# Patient Record
Sex: MALE | Race: WHITE | NOT HISPANIC OR LATINO | Employment: OTHER | URBAN - NONMETROPOLITAN AREA
[De-identification: names, ages, dates, MRNs, and addresses within clinical notes are randomized per-mention and may not be internally consistent; named-entity substitution may affect disease eponyms.]

---

## 2017-01-31 DIAGNOSIS — Z12.11 COLON CANCER SCREENING: Primary | ICD-10-CM

## 2017-01-31 LAB
CONTROL: NORMAL
HEMOCCULT STL QL: NORMAL

## 2017-01-31 PROCEDURE — 82274 ASSAY TEST FOR BLOOD FECAL: CPT | Performed by: FAMILY MEDICINE

## 2018-07-19 ENCOUNTER — OFFICE VISIT (OUTPATIENT)
Dept: UROLOGY | Facility: CLINIC | Age: 43
End: 2018-07-19

## 2018-07-19 VITALS — BODY MASS INDEX: 26.11 KG/M2 | HEIGHT: 75 IN | WEIGHT: 210 LBS | TEMPERATURE: 97.3 F

## 2018-07-19 DIAGNOSIS — Z30.09 CONSULTATION FOR STERILIZATION: Primary | ICD-10-CM

## 2018-07-19 PROCEDURE — 99203 OFFICE O/P NEW LOW 30 MIN: CPT | Performed by: UROLOGY

## 2018-07-19 RX ORDER — DIAZEPAM 10 MG/1
10 TABLET ORAL ONCE
Qty: 1 TABLET | Refills: 0 | Status: SHIPPED | OUTPATIENT
Start: 2018-07-19 | End: 2018-07-19

## 2018-07-19 RX ORDER — OXYCODONE HYDROCHLORIDE AND ACETAMINOPHEN 5; 325 MG/1; MG/1
1 TABLET ORAL EVERY 4 HOURS PRN
Qty: 18 TABLET | Refills: 0 | Status: SHIPPED | OUTPATIENT
Start: 2018-07-19

## 2019-09-22 ENCOUNTER — APPOINTMENT (OUTPATIENT)
Dept: CT IMAGING | Facility: HOSPITAL | Age: 44
End: 2019-09-22

## 2019-09-22 ENCOUNTER — HOSPITAL ENCOUNTER (EMERGENCY)
Facility: HOSPITAL | Age: 44
Discharge: HOME | End: 2019-09-22
Admitting: EMERGENCY MEDICINE

## 2019-09-22 VITALS
HEIGHT: 76 IN | DIASTOLIC BLOOD PRESSURE: 81 MMHG | BODY MASS INDEX: 26.3 KG/M2 | OXYGEN SATURATION: 99 % | HEART RATE: 70 BPM | SYSTOLIC BLOOD PRESSURE: 120 MMHG | TEMPERATURE: 98 F | RESPIRATION RATE: 17 BRPM | WEIGHT: 216 LBS

## 2019-09-22 DIAGNOSIS — R10.31 RIGHT LOWER QUADRANT ABDOMINAL PAIN: ICD-10-CM

## 2019-09-22 DIAGNOSIS — R10.9 FLANK PAIN: Primary | ICD-10-CM

## 2019-09-22 LAB
ALBUMIN SERPL-MCNC: 4.2 G/DL (ref 3.5–5.2)
ALBUMIN/GLOB SERPL: 1.5 G/DL
ALP SERPL-CCNC: 76 U/L (ref 39–117)
ALT SERPL W P-5'-P-CCNC: 19 U/L (ref 1–41)
ANION GAP SERPL CALCULATED.3IONS-SCNC: 9 MMOL/L (ref 5–15)
AST SERPL-CCNC: 22 U/L (ref 1–40)
BASOPHILS # BLD AUTO: 0.08 10*3/MM3 (ref 0–0.2)
BASOPHILS NFR BLD AUTO: 1.1 % (ref 0–1.5)
BILIRUB SERPL-MCNC: 0.2 MG/DL (ref 0.2–1.2)
BILIRUB UR QL STRIP: NEGATIVE
BUN BLD-MCNC: 17 MG/DL (ref 6–20)
BUN/CREAT SERPL: 20 (ref 7–25)
CALCIUM SPEC-SCNC: 8.8 MG/DL (ref 8.6–10.5)
CHLORIDE SERPL-SCNC: 104 MMOL/L (ref 98–107)
CLARITY UR: CLEAR
CO2 SERPL-SCNC: 29 MMOL/L (ref 22–29)
COLOR UR: YELLOW
CREAT BLD-MCNC: 0.85 MG/DL (ref 0.76–1.27)
D-LACTATE SERPL-SCNC: 1.3 MMOL/L (ref 0.5–2)
DEPRECATED RDW RBC AUTO: 39.5 FL (ref 37–54)
EOSINOPHIL # BLD AUTO: 0.26 10*3/MM3 (ref 0–0.4)
EOSINOPHIL NFR BLD AUTO: 3.6 % (ref 0.3–6.2)
ERYTHROCYTE [DISTWIDTH] IN BLOOD BY AUTOMATED COUNT: 12.8 % (ref 12.3–15.4)
GFR SERPL CREATININE-BSD FRML MDRD: 98 ML/MIN/1.73
GLOBULIN UR ELPH-MCNC: 2.8 GM/DL
GLUCOSE BLD-MCNC: 113 MG/DL (ref 65–99)
GLUCOSE UR STRIP-MCNC: NEGATIVE MG/DL
HCT VFR BLD AUTO: 44.5 % (ref 37.5–51)
HGB BLD-MCNC: 14.4 G/DL (ref 13–17.7)
HGB UR QL STRIP.AUTO: NEGATIVE
IMM GRANULOCYTES # BLD AUTO: 0.01 10*3/MM3 (ref 0–0.05)
IMM GRANULOCYTES NFR BLD AUTO: 0.1 % (ref 0–0.5)
KETONES UR QL STRIP: NEGATIVE
LEUKOCYTE ESTERASE UR QL STRIP.AUTO: NEGATIVE
LYMPHOCYTES # BLD AUTO: 2.86 10*3/MM3 (ref 0.7–3.1)
LYMPHOCYTES NFR BLD AUTO: 39.5 % (ref 19.6–45.3)
MCH RBC QN AUTO: 27.4 PG (ref 26.6–33)
MCHC RBC AUTO-ENTMCNC: 32.4 G/DL (ref 31.5–35.7)
MCV RBC AUTO: 84.8 FL (ref 79–97)
MONOCYTES # BLD AUTO: 0.51 10*3/MM3 (ref 0.1–0.9)
MONOCYTES NFR BLD AUTO: 7 % (ref 5–12)
NEUTROPHILS # BLD AUTO: 3.52 10*3/MM3 (ref 1.7–7)
NEUTROPHILS NFR BLD AUTO: 48.7 % (ref 42.7–76)
NITRITE UR QL STRIP: NEGATIVE
NRBC BLD AUTO-RTO: 0 /100 WBC (ref 0–0.2)
PH UR STRIP.AUTO: <=5 [PH] (ref 5–8)
PLATELET # BLD AUTO: 219 10*3/MM3 (ref 140–450)
PMV BLD AUTO: 10.7 FL (ref 6–12)
POTASSIUM BLD-SCNC: 4.2 MMOL/L (ref 3.5–5.2)
PROT SERPL-MCNC: 7 G/DL (ref 6–8.5)
PROT UR QL STRIP: NEGATIVE
RBC # BLD AUTO: 5.25 10*6/MM3 (ref 4.14–5.8)
SODIUM BLD-SCNC: 142 MMOL/L (ref 136–145)
SP GR UR STRIP: 1.03 (ref 1–1.03)
UROBILINOGEN UR QL STRIP: NORMAL
WBC NRBC COR # BLD: 7.24 10*3/MM3 (ref 3.4–10.8)

## 2019-09-22 PROCEDURE — 80053 COMPREHEN METABOLIC PANEL: CPT | Performed by: NURSE PRACTITIONER

## 2019-09-22 PROCEDURE — 85025 COMPLETE CBC W/AUTO DIFF WBC: CPT | Performed by: NURSE PRACTITIONER

## 2019-09-22 PROCEDURE — 83605 ASSAY OF LACTIC ACID: CPT | Performed by: NURSE PRACTITIONER

## 2019-09-22 PROCEDURE — 25010000002 IOPAMIDOL 61 % SOLUTION: Performed by: NURSE PRACTITIONER

## 2019-09-22 PROCEDURE — 81003 URINALYSIS AUTO W/O SCOPE: CPT | Performed by: NURSE PRACTITIONER

## 2019-09-22 PROCEDURE — 74177 CT ABD & PELVIS W/CONTRAST: CPT

## 2019-09-22 PROCEDURE — 99283 EMERGENCY DEPT VISIT LOW MDM: CPT

## 2019-09-22 RX ORDER — SODIUM CHLORIDE 0.9 % (FLUSH) 0.9 %
10 SYRINGE (ML) INJECTION AS NEEDED
Status: DISCONTINUED | OUTPATIENT
Start: 2019-09-22 | End: 2019-09-22 | Stop reason: HOSPADM

## 2019-09-22 RX ORDER — LIDOCAINE 50 MG/G
1 PATCH TOPICAL EVERY 24 HOURS
Qty: 5 PATCH | Refills: 1 | Status: SHIPPED | OUTPATIENT
Start: 2019-09-22

## 2019-09-22 RX ORDER — NAPROXEN 500 MG/1
500 TABLET ORAL 2 TIMES DAILY PRN
Qty: 15 TABLET | Refills: 0 | Status: SHIPPED | OUTPATIENT
Start: 2019-09-22

## 2019-09-22 RX ORDER — CYCLOBENZAPRINE HCL 10 MG
10 TABLET ORAL 3 TIMES DAILY PRN
Qty: 15 TABLET | Refills: 0 | Status: SHIPPED | OUTPATIENT
Start: 2019-09-22 | End: 2019-10-07

## 2019-09-22 RX ADMIN — SODIUM CHLORIDE 500 ML: 9 INJECTION, SOLUTION INTRAVENOUS at 13:53

## 2019-09-22 RX ADMIN — IOPAMIDOL 100 ML: 612 INJECTION, SOLUTION INTRAVENOUS at 15:15

## 2019-09-22 NOTE — ED PROVIDER NOTES
Subjective   Patient is a 40-year-old male that presents ER today with complaint of flank pain.  Patient reports that for the past 3 weeks he has had right flank pain.  He states that is been constant in nature.  He states that he is now having pain that radiates to the right lower quadrant.  Patient denies nausea or vomiting.  He denies any diarrhea constipation.  Patient denies fever.  Patient denies any hematuria or dysuria.  The patient states that he initially thought that the pain was due to his back.  He states that he has seen his chiropractor however he has had no improvement with his pain.  Patient denies any known injury to his back.  He states he is concerned that he may have a kidney stone.  The patient still does have his appendix.  He presents here today for further evaluation        History provided by:  Patient   used: No    Abdominal Pain   Pain location:  R flank  Pain quality: sharp and stabbing    Pain radiates to:  RLQ  Pain severity:  Mild  Onset quality:  Sudden  Duration:  3 weeks  Timing:  Constant  Progression:  Worsening  Chronicity:  New  Context: not alcohol use, not awakening from sleep, not diet changes, not eating, not laxative use, not medication withdrawal, not previous surgeries, not recent illness, not recent sexual activity, not recent travel, not retching, not sick contacts, not suspicious food intake and not trauma    Relieved by:  Nothing  Worsened by:  Nothing  Ineffective treatments:  None tried  Associated symptoms: nausea    Associated symptoms: no anorexia, no belching, no chest pain, no chills, no constipation, no cough, no diarrhea, no dysuria, no fatigue, no fever, no flatus, no hematemesis, no hematochezia, no hematuria, no melena, no shortness of breath, no sore throat, no vaginal bleeding, no vaginal discharge and no vomiting    Risk factors: no alcohol abuse, no aspirin use, not elderly, has not had multiple surgeries, no NSAID use, not obese,  not pregnant and no recent hospitalization        Review of Systems   Constitutional: Negative for chills, fatigue and fever.   HENT: Negative for sore throat.    Respiratory: Negative for cough and shortness of breath.    Cardiovascular: Negative for chest pain.   Gastrointestinal: Positive for abdominal pain and nausea. Negative for anorexia, constipation, diarrhea, flatus, hematemesis, hematochezia, melena and vomiting.   Genitourinary: Negative for dysuria, hematuria, vaginal bleeding and vaginal discharge.   All other systems reviewed and are negative.      No past medical history on file.    No Known Allergies    No past surgical history on file.    Family History   Problem Relation Age of Onset   • No Known Problems Father    • No Known Problems Mother        Social History     Socioeconomic History   • Marital status:      Spouse name: Not on file   • Number of children: Not on file   • Years of education: Not on file   • Highest education level: Not on file   Tobacco Use   • Smoking status: Never Smoker   • Smokeless tobacco: Never Used   Substance and Sexual Activity   • Alcohol use: No   • Drug use: No   • Sexual activity: Defer           Objective   Physical Exam    Procedures           ED Course  ED Course as of Sep 22 1546   Sun Sep 22, 2019   1456 Pt case discussed with CHAVA Somers  who will assume pt case at this time.   [LF]   1535 Patient transferred to my care by nurse practitioner Valerie Davidson at her shift change.  Above is her history, review of system, physical examination and work-up.  I did interview and evaluate the patient and agree with plan of care at this time.  Briefly this is a 43-year-old  male with right flank pain radiating to the right lower quadrant intermittently for the last 3 weeks.  Worse with movement.  He has been seen a chiropractor without relief.  [CP]   1537 Impression    1. No acute process in the abdomen or pelvis.  2. Solitary tiny  nonobstructing right renal stone. No obstructing stone.  3. No inflammatory changes along the bowel.  4. Grade 1 lytic spondylolisthesis at L5-S1.        This report was finalized on 09/22/2019 15:32 by Dr Jason Swenson, .        Specimen Collected: 09/22/19 15:27 Last Resulted: 09/22/19 15:32        [CP]      ED Course User Index  [CP] Bob Porter PA-C  [LF] Valerie Davidson, AGUSTINA                  MDM  Number of Diagnoses or Management Options  Flank pain: new and requires workup  Right lower quadrant abdominal pain: new and requires workup  Diagnosis management comments: 43-year-old with right flank pain ongoing for 3 weeks intermittently worse with movement, twisting and bending forward.  CT scan of the abdomen was negative for any acute abdominal pathology but did show spondee lithiasis of L4-L5.  Likely causing his symptoms.  Labs are within normal limits.  No indications for further work-up at this time.  At time of discharge stable vital signs pain decently controlled.  We will discharge and have follow-up with the orthopedic [place] primary care physician.  Strict return precautions given strongly encouraged follow-up.       Amount and/or Complexity of Data Reviewed  Clinical lab tests: reviewed and ordered  Tests in the radiology section of CPT®: ordered and reviewed  Review and summarize past medical records: yes  Discuss the patient with other providers: yes  Independent visualization of images, tracings, or specimens: yes    Risk of Complications, Morbidity, and/or Mortality  Presenting problems: moderate  Diagnostic procedures: moderate  Management options: moderate    Patient Progress  Patient progress: stable      Final diagnoses:   Flank pain   Right lower quadrant abdominal pain              Bob Porter PA-C  09/22/19 1546

## 2019-09-22 NOTE — DISCHARGE INSTRUCTIONS
Please follow-up with your primary care physician in the orthopedic [place] for reevaluation of the symptoms as they did not improve the next 3 to 4 days.  At this time your CT scan of your abdomen is negative for any acute abnormalities however there does show some lumbar spine spondylolithesis. This is mild on your CT but could be causing your symptoms.  Please try the medications as prescribed and follow-up with the orthopedic [place].  Return to the ER should you develop any new, worsening or other concerning symptoms such as those listed below.      Contact a health care provider if:  You have pain that gets worse or does not get better.  Get help right away if:  You have severe back pain.  You develop weakness or numbness in your legs.  You are unable to stand or walk

## 2019-10-22 ENCOUNTER — OFFICE VISIT (OUTPATIENT)
Dept: PRIMARY CARE CLINIC | Age: 44
End: 2019-10-22
Payer: MEDICAID

## 2019-10-22 VITALS
DIASTOLIC BLOOD PRESSURE: 70 MMHG | OXYGEN SATURATION: 98 % | WEIGHT: 222.2 LBS | RESPIRATION RATE: 18 BRPM | BODY MASS INDEX: 27.06 KG/M2 | HEART RATE: 67 BPM | HEIGHT: 76 IN | TEMPERATURE: 98 F | SYSTOLIC BLOOD PRESSURE: 130 MMHG

## 2019-10-22 DIAGNOSIS — Z23 NEED FOR INFLUENZA VACCINATION: ICD-10-CM

## 2019-10-22 DIAGNOSIS — Z00.00 WELL ADULT EXAM: Primary | ICD-10-CM

## 2019-10-22 PROCEDURE — 90471 IMMUNIZATION ADMIN: CPT | Performed by: FAMILY MEDICINE

## 2019-10-22 PROCEDURE — G8482 FLU IMMUNIZE ORDER/ADMIN: HCPCS | Performed by: FAMILY MEDICINE

## 2019-10-22 PROCEDURE — 90686 IIV4 VACC NO PRSV 0.5 ML IM: CPT | Performed by: FAMILY MEDICINE

## 2019-10-22 PROCEDURE — 99386 PREV VISIT NEW AGE 40-64: CPT | Performed by: FAMILY MEDICINE

## 2019-10-22 ASSESSMENT — PATIENT HEALTH QUESTIONNAIRE - PHQ9
2. FEELING DOWN, DEPRESSED OR HOPELESS: 0
SUM OF ALL RESPONSES TO PHQ QUESTIONS 1-9: 0
SUM OF ALL RESPONSES TO PHQ9 QUESTIONS 1 & 2: 0
1. LITTLE INTEREST OR PLEASURE IN DOING THINGS: 0
SUM OF ALL RESPONSES TO PHQ QUESTIONS 1-9: 0

## 2019-11-07 ENCOUNTER — NURSE ONLY (OUTPATIENT)
Dept: PRIMARY CARE CLINIC | Age: 44
End: 2019-11-07
Payer: MEDICAID

## 2019-11-07 DIAGNOSIS — Z00.00 WELL ADULT EXAM: Primary | ICD-10-CM

## 2019-11-07 LAB
ALBUMIN SERPL-MCNC: 4.4 G/DL (ref 3.5–5.2)
ALP BLD-CCNC: 72 U/L (ref 40–130)
ALT SERPL-CCNC: 25 U/L (ref 5–41)
ANION GAP SERPL CALCULATED.3IONS-SCNC: 12 MMOL/L (ref 7–19)
AST SERPL-CCNC: 26 U/L (ref 5–40)
BILIRUB SERPL-MCNC: 0.6 MG/DL (ref 0.2–1.2)
BUN BLDV-MCNC: 11 MG/DL (ref 6–20)
CALCIUM SERPL-MCNC: 9.5 MG/DL (ref 8.6–10)
CHLORIDE BLD-SCNC: 101 MMOL/L (ref 98–111)
CHOLESTEROL, TOTAL: 204 MG/DL (ref 160–199)
CO2: 29 MMOL/L (ref 22–29)
CREAT SERPL-MCNC: 1 MG/DL (ref 0.5–1.2)
GFR NON-AFRICAN AMERICAN: >60
GLUCOSE BLD-MCNC: 94 MG/DL (ref 74–109)
HDLC SERPL-MCNC: 54 MG/DL (ref 55–121)
LDL CHOLESTEROL CALCULATED: 127 MG/DL
POTASSIUM SERPL-SCNC: 4.4 MMOL/L (ref 3.5–5)
SODIUM BLD-SCNC: 142 MMOL/L (ref 136–145)
TOTAL PROTEIN: 7.3 G/DL (ref 6.6–8.7)
TRIGL SERPL-MCNC: 114 MG/DL (ref 0–149)

## 2019-11-07 PROCEDURE — 36415 COLL VENOUS BLD VENIPUNCTURE: CPT | Performed by: FAMILY MEDICINE

## 2020-10-26 ENCOUNTER — OFFICE VISIT (OUTPATIENT)
Dept: PRIMARY CARE CLINIC | Age: 45
End: 2020-10-26
Payer: MEDICAID

## 2020-10-26 VITALS
OXYGEN SATURATION: 99 % | RESPIRATION RATE: 16 BRPM | HEART RATE: 75 BPM | WEIGHT: 216.6 LBS | TEMPERATURE: 98.3 F | DIASTOLIC BLOOD PRESSURE: 86 MMHG | SYSTOLIC BLOOD PRESSURE: 118 MMHG | HEIGHT: 75 IN | BODY MASS INDEX: 26.93 KG/M2

## 2020-10-26 PROCEDURE — 90686 IIV4 VACC NO PRSV 0.5 ML IM: CPT | Performed by: FAMILY MEDICINE

## 2020-10-26 PROCEDURE — 99396 PREV VISIT EST AGE 40-64: CPT | Performed by: FAMILY MEDICINE

## 2020-10-26 PROCEDURE — G8482 FLU IMMUNIZE ORDER/ADMIN: HCPCS | Performed by: FAMILY MEDICINE

## 2020-10-26 PROCEDURE — 90471 IMMUNIZATION ADMIN: CPT | Performed by: FAMILY MEDICINE

## 2020-10-26 RX ORDER — NAPROXEN 500 MG/1
TABLET ORAL
COMMUNITY
Start: 2019-09-30 | End: 2020-10-26

## 2020-10-26 RX ORDER — CYCLOBENZAPRINE HCL 10 MG
TABLET ORAL
COMMUNITY
End: 2020-10-26

## 2020-10-26 ASSESSMENT — PATIENT HEALTH QUESTIONNAIRE - PHQ9
SUM OF ALL RESPONSES TO PHQ QUESTIONS 1-9: 0
SUM OF ALL RESPONSES TO PHQ9 QUESTIONS 1 & 2: 0
2. FEELING DOWN, DEPRESSED OR HOPELESS: 0
1. LITTLE INTEREST OR PLEASURE IN DOING THINGS: 0

## 2020-10-26 NOTE — PATIENT INSTRUCTIONS
GET counter athlete's foot medication-cream or powder. Dry between your toes after each bath and then apply the medicine. It should clear up the stuff between your toes. You can put over-the-counter hydrocortisone cream on the top of your foot but it would not help between your toes. We are committed to providing you with the best care possible. In order to help us achieve these goals please remember to bring all medications, herbal products, and over the counter supplements with you to each visit. If your provider has ordered testing for you, please be sure to follow up with our office if you have not received results within 7 days after the testing took place. *If you receive a survey after visiting one of our offices, please take time to share your experience concerning your physician office visit. These surveys are confidential and no health information about you is shared. We are eager to improve for you and we are counting on your feedback to help make that happen. Patient Education        Seborrheic Keratosis: Care Instructions  Your Care Instructions  Seborrheic keratoses are raised skin growths that look scaly or warty. They usually look like they were stuck onto the skin. They most often grow in groups on the back or chest and are more common in older people. A seborrheic keratosis can be tan or dark brown. A seborrheic keratosis is not a mole and is almost always harmless. But it is still a good idea to check your skin regularly. Sometimes a seborrheic keratosis can itch. Scratching it can cause it to bleed and sometimes even scar. A seborrheic keratosis is removed only if it bothers you. The doctor will freeze it or scrape it off with a tool. The doctor can also use a laser to remove a seborrheic keratosis. Treatment usually results in normal-looking skin, but it can leave a light or dark juan antonio or even a scar on the skin.   Follow-up care is a key part of your treatment and safety. Be sure to make and go to all appointments, and call your doctor if you are having problems. It's also a good idea to know your test results and keep a list of the medicines you take. How can you care for yourself at home? · If clothing irritates your seborrheic keratosis, cover it with a bandage to prevent rubbing and bleeding. · If you have a seborrheic keratosis removed, clean the area with soap and water two times a day unless your doctor gives you different instructions. Don't use hydrogen peroxide or alcohol, which can slow healing. ? You may cover the wound with a thin layer of petroleum jelly, such as Vaseline, and a nonstick bandage. · If you see a change in a skin growth, contact your doctor. Look for:  ? A mole that bleeds. ? A fast-growing mole. ? A scaly or crusted growth on the skin. ? A sore that will not heal.  When should you call for help? Call your doctor now or seek immediate medical care if:    · You have an area of normal skin that suddenly changes in shape, size, or how it looks.     · Your skin is badly broken from scratching.     · You have signs of infection such as:  ? Pain, warmth, or swelling in your skin. ? Red streaks near a wound in your skin. ? Pus coming from a wound in your skin. ? A fever not due to the flu or other illness. Watch closely for changes in your health, and be sure to contact your doctor if:    · You do not get better as expected. Where can you learn more? Go to https://Just Eatnisha.Codelearn. org and sign in to your [employer] account. Enter T823 in the [facility] box to learn more about \"Seborrheic Keratosis: Care Instructions. \"     If you do not have an account, please click on the \"Sign Up Now\" link. Current as of: July 2, 2020               Content Version: 12.6  © 5588-6363 [employer]. Care instructions adapted under license by [facility] ([facility]).  If you have questions about a medical condition or this instruction, always ask your healthcare professional. [place] [address] any warranty or liability for your use of this information. Patient Education        Athlete's Foot: Care Instructions  Your Care Instructions     Athlete's foot is an itchy rash on the foot caused by an infection with a fungus. You can get it by going barefoot in wet public areas, such as swimming pools or locker rooms. Many times there is no clear reason why you get athlete's foot. You can easily treat athlete's foot by putting medicine on your feet for 1 to 6 weeks. In some cases, a doctor may prescribe pills to kill the fungus. Follow-up care is a key part of your treatment and safety. Be sure to make and go to all appointments, and call your doctor if you are having problems. It's also a good idea to know your test results and keep a list of the medicines you take. How can you care for yourself at home? · Your doctor may suggest an over-the counter lotion or spray or may prescribe a medicine. Take your medicines exactly as prescribed. Call your doctor if you think you are having a problem with your medicine. · Keep your feet clean and dry. · When you get dressed, put your socks on before your underwear. This can prevent the fungus from spreading from your feet to your groin. To prevent athlete's foot  · Wear flip-flops or other shower sandals in public locker rooms and showers and by the pool. · Dry between your toes after swimming or bathing. · Wear leather shoes or sandals, which let air get to your feet. · Change your socks as needed so your feet stay as dry as possible. · Use antifungal powder on your feet. When should you call for help? Watch closely for changes in your health, and be sure to contact your doctor if:    · You do not get better as expected. Where can you learn more? Go to https://chpepiceweb.Kick Sport. org and sign in to your [employer] account.  Enter M498 in the [address] box to learn more about \"Athlete's Foot: Care Instructions. \"     If you do not have an account, please click on the \"Sign Up Now\" link. Current as of: July 2, 2020               Content Version: 12.6  © 6336-6463 [employer], Incorporated. Care instructions adapted under license by [facility] ([facility]). If you have questions about a medical condition or this instruction, always ask your healthcare professional. [address] any warranty or liability for your use of this information.

## 2020-10-26 NOTE — PROGRESS NOTES
SUBJECTIVE:   Elham Pete is a 39 y.o. male presenting for his annual checkup. He is usually healthy. His wife has 3 spots that she would like me to look at. He denies any problems. Labs were done last year and the LDL was 127 with an HDL of 54 and a total of 204. He also had a spot on his left earlobe that was a little tender and they squeezed it and some clear liquid came out. It is getting better. He is also complaining of an itchy spot. on top of his left foot. There are no active problems to display for this patient. No current outpatient medications on file. No current facility-administered medications for this visit. Past Medical History:   Diagnosis Date    Herniated disc, cervical      History reviewed. No pertinent surgical history. Family History   Problem Relation Age of Onset    Heart Disease Father      Social History     Tobacco Use    Smoking status: Never Smoker    Smokeless tobacco: Never Used   Substance Use Topics    Alcohol use: No       Allergies: Patient has no known allergies. ROS:  Feeling well. No dyspnea or chest pain on exertion. No abdominal pain, change in bowel habits, black or bloody stools. No urinary tract or prostatic symptoms. No neurological complaints. All other systems negative. OBJECTIVE:   The patient appears well, alert, oriented x 3, in no distress. /86 (Site: Right Upper Arm, Position: Sitting, Cuff Size: Medium Adult)   Pulse 75   Temp 98.3 °F (36.8 °C) (Temporal)   Resp 16   Ht 6' 3\" (1.905 m)   Wt 216 lb 9.6 oz (98.2 kg)   SpO2 99%   BMI 27.07 kg/m²   Skin normal, no suspicious skin lesions. Left anterior shoulder there are 2 macular lesions which are tan and darker brown with skin lines running through them. On his left pinna there is a small white bump but he says it is getting better. Between his toes there is a whitish damp rash with a secondarily excoriated area on top of his left foot.   ENT normal.  Neck supple. No adenopathy or thyromegaly. NADIR. Lungs are clear, good air entry, no wheezes, rhonchi or rales. S1 and S2 normal, no murmurs, regular rate and rhythm. Abdomen is soft without tenderness, guarding, mass or organomegaly.  exam: Examined in light of his age and lack of symptoms. Extremities show no edema, normal peripheral pulses. Neurological is normal without focal findings. Psychiatric exam, no signs of depression. ASSESSMENT:   1. Well adult exam    2. Need for influenza vaccination        PLAN:   Lifestyle advice: discussed diet and exercise  MEDICATIONS:  No orders of the defined types were placed in this encounter. ORDERS:  Orders Placed This Encounter   Procedures    INFLUENZA, QUADV, 3 YRS AND OLDER, IM PF, PREFILL SYR OR SDV, 0.5ML (Jhonny Hicks, PF)   Reassurance was given regarding the 2 lesions on his left anterior shoulder. We talked about how to treat his athlete's foot. If the lesion on his ear changes he is to let me know but I think it is benign. Talked about doing blood work but we will wait till next year. If he has any problems he is to let me know. Follow-up:  Return in about 1 year (around 10/26/2021) for Physical and FASTING LABS in 1 year. PATIENT INSTRUCTIONS:  Patient Instructions     GET counter athlete's foot medication-cream or powder. Dry between your toes after each bath and then apply the medicine. It should clear up the stuff between your toes. You can put over-the-counter hydrocortisone cream on the top of your foot but it would not help between your toes. We are committed to providing you with the best care possible. In order to help us achieve these goals please remember to bring all medications, herbal products, and over the counter supplements with you to each visit.      If your provider has ordered testing for you, please be sure to follow up with our office if you have not received results within 7 days after the testing took place. *If you receive a survey after visiting one of our offices, please take time to share your experience concerning your physician office visit. These surveys are confidential and no health information about you is shared. We are eager to improve for you and we are counting on your feedback to help make that happen. Patient Education        Seborrheic Keratosis: Care Instructions  Your Care Instructions  Seborrheic keratoses are raised skin growths that look scaly or warty. They usually look like they were stuck onto the skin. They most often grow in groups on the back or chest and are more common in older people. A seborrheic keratosis can be tan or dark brown. A seborrheic keratosis is not a mole and is almost always harmless. But it is still a good idea to check your skin regularly. Sometimes a seborrheic keratosis can itch. Scratching it can cause it to bleed and sometimes even scar. A seborrheic keratosis is removed only if it bothers you. The doctor will freeze it or scrape it off with a tool. The doctor can also use a laser to remove a seborrheic keratosis. Treatment usually results in normal-looking skin, but it can leave a light or dark juan antonio or even a scar on the skin. Follow-up care is a key part of your treatment and safety. Be sure to make and go to all appointments, and call your doctor if you are having problems. It's also a good idea to know your test results and keep a list of the medicines you take. How can you care for yourself at home? · If clothing irritates your seborrheic keratosis, cover it with a bandage to prevent rubbing and bleeding. · If you have a seborrheic keratosis removed, clean the area with soap and water two times a day unless your doctor gives you different instructions. Don't use hydrogen peroxide or alcohol, which can slow healing. ? You may cover the wound with a thin layer of petroleum jelly, such as Vaseline, and a nonstick bandage.   · If you see a change in a skin growth, contact your doctor. Look for:  ? A mole that bleeds. ? A fast-growing mole. ? A scaly or crusted growth on the skin. ? A sore that will not heal.  When should you call for help? Call your doctor now or seek immediate medical care if:    · You have an area of normal skin that suddenly changes in shape, size, or how it looks.     · Your skin is badly broken from scratching.     · You have signs of infection such as:  ? Pain, warmth, or swelling in your skin. ? Red streaks near a wound in your skin. ? Pus coming from a wound in your skin. ? A fever not due to the flu or other illness. Watch closely for changes in your health, and be sure to contact your doctor if:    · You do not get better as expected. Where can you learn more? Go to https://Ecriopepiceweb.Vinogusto.com. org and sign in to your [employer] account. Enter J573 in the [employer] box to learn more about \"Seborrheic Keratosis: Care Instructions. \"     If you do not have an account, please click on the \"Sign Up Now\" link. Current as of: July 2, 2020               Content Version: 12.6  © 8026-7330 [employer]. Care instructions adapted under license by [facility] ([facility]). If you have questions about a medical condition or this instruction, always ask your healthcare professional. [place] [address] any warranty or liability for your use of this information. Patient Education        Athlete's Foot: Care Instructions  Your Care Instructions     Athlete's foot is an itchy rash on the foot caused by an infection with a fungus. You can get it by going barefoot in wet public areas, such as swimming pools or locker rooms. Many times there is no clear reason why you get athlete's foot. You can easily treat athlete's foot by putting medicine on your feet for 1 to 6 weeks. In some cases, a doctor may prescribe pills to kill the fungus.   Follow-up care is a key part of your treatment and safety. Be sure to make and go to all appointments, and call your doctor if you are having problems. It's also a good idea to know your test results and keep a list of the medicines you take. How can you care for yourself at home? · Your doctor may suggest an over-the counter lotion or spray or may prescribe a medicine. Take your medicines exactly as prescribed. Call your doctor if you think you are having a problem with your medicine. · Keep your feet clean and dry. · When you get dressed, put your socks on before your underwear. This can prevent the fungus from spreading from your feet to your groin. To prevent athlete's foot  · Wear flip-flops or other shower sandals in public locker rooms and showers and by the pool. · Dry between your toes after swimming or bathing. · Wear leather shoes or sandals, which let air get to your feet. · Change your socks as needed so your feet stay as dry as possible. · Use antifungal powder on your feet. When should you call for help? Watch closely for changes in your health, and be sure to contact your doctor if:    · You do not get better as expected. Where can you learn more? Go to https://Northern Defence & Securitypepiceweb.Dynamixyz. org and sign in to your [employer] account. Enter M498 in the [facility] box to learn more about \"Athlete's Foot: Care Instructions. \"     If you do not have an account, please click on the \"Sign Up Now\" link. Current as of: July 2, 2020               Content Version: 12.6  © 2006-2020 [employer], Incorporated. Care instructions adapted under license by [facility] ([facility]). If you have questions about a medical condition or this instruction, always ask your healthcare professional. [place] [address] any warranty or liability for your use of this information. EMR Dragon/transcription disclaimer:  Much of this encounter note is electronic transcription/translation of spoken language to printed texts.   The electronic translation of spoken language may be erroneous, or at times, nonsensical words or phrases may be inadvertently transcribed.   Although I have reviewed the note for such errors, some may still exist.

## 2021-11-01 ENCOUNTER — OFFICE VISIT (OUTPATIENT)
Dept: PRIMARY CARE CLINIC | Age: 46
End: 2021-11-01
Payer: MEDICAID

## 2021-11-01 VITALS
HEART RATE: 61 BPM | OXYGEN SATURATION: 98 % | SYSTOLIC BLOOD PRESSURE: 120 MMHG | BODY MASS INDEX: 27.1 KG/M2 | TEMPERATURE: 97.6 F | WEIGHT: 218 LBS | DIASTOLIC BLOOD PRESSURE: 80 MMHG | HEIGHT: 75 IN

## 2021-11-01 DIAGNOSIS — Z00.00 WELL ADULT EXAM: Primary | ICD-10-CM

## 2021-11-01 LAB
ALBUMIN SERPL-MCNC: 4.4 G/DL (ref 3.5–5.2)
ALP BLD-CCNC: 91 U/L (ref 40–130)
ALT SERPL-CCNC: 23 U/L (ref 5–41)
ANION GAP SERPL CALCULATED.3IONS-SCNC: 10 MMOL/L (ref 7–19)
AST SERPL-CCNC: 20 U/L (ref 5–40)
BILIRUB SERPL-MCNC: 0.3 MG/DL (ref 0.2–1.2)
BUN BLDV-MCNC: 11 MG/DL (ref 6–20)
CALCIUM SERPL-MCNC: 9.4 MG/DL (ref 8.6–10)
CHLORIDE BLD-SCNC: 103 MMOL/L (ref 98–111)
CHOLESTEROL, TOTAL: 209 MG/DL (ref 160–199)
CO2: 28 MMOL/L (ref 22–29)
CREAT SERPL-MCNC: 1 MG/DL (ref 0.5–1.2)
GFR AFRICAN AMERICAN: >59
GFR NON-AFRICAN AMERICAN: >60
GLUCOSE BLD-MCNC: 93 MG/DL (ref 74–109)
HDLC SERPL-MCNC: 49 MG/DL (ref 55–121)
LDL CHOLESTEROL CALCULATED: 117 MG/DL
POTASSIUM SERPL-SCNC: 4.3 MMOL/L (ref 3.5–5)
SODIUM BLD-SCNC: 141 MMOL/L (ref 136–145)
TOTAL PROTEIN: 7.4 G/DL (ref 6.6–8.7)
TRIGL SERPL-MCNC: 213 MG/DL (ref 0–149)

## 2021-11-01 PROCEDURE — 99396 PREV VISIT EST AGE 40-64: CPT | Performed by: FAMILY MEDICINE

## 2021-11-01 PROCEDURE — G8484 FLU IMMUNIZE NO ADMIN: HCPCS | Performed by: FAMILY MEDICINE

## 2021-11-01 ASSESSMENT — PATIENT HEALTH QUESTIONNAIRE - PHQ9
SUM OF ALL RESPONSES TO PHQ QUESTIONS 1-9: 0
1. LITTLE INTEREST OR PLEASURE IN DOING THINGS: 0
SUM OF ALL RESPONSES TO PHQ9 QUESTIONS 1 & 2: 0
2. FEELING DOWN, DEPRESSED OR HOPELESS: 0
SUM OF ALL RESPONSES TO PHQ QUESTIONS 1-9: 0
SUM OF ALL RESPONSES TO PHQ QUESTIONS 1-9: 0

## 2021-11-01 NOTE — PROGRESS NOTES
SUBJECTIVE:   Janice Moreno is a 55 y.o. male presenting for his annual checkup. He runs a tree service. He says he is doing very well. He is having no problems. There are no problems to display for this patient. No current outpatient medications on file. No current facility-administered medications for this visit. Past Medical History:   Diagnosis Date    Herniated disc, cervical      No past surgical history on file. Family History   Problem Relation Age of Onset    Heart Disease Father      Social History     Tobacco Use    Smoking status: Never Smoker    Smokeless tobacco: Never Used   Substance Use Topics    Alcohol use: No       Allergies: Patient has no known allergies. ROS:  Feeling well. No dyspnea or chest pain on exertion. No abdominal pain, change in bowel habits, black or bloody stools. No urinary tract or prostatic symptoms. No neurological complaints. All other systems negative. OBJECTIVE:   The patient appears well, alert, oriented x 3, in no distress. /80   Pulse 61   Temp 97.6 °F (36.4 °C)   Ht 6' 3\" (1.905 m)   Wt 218 lb (98.9 kg)   SpO2 98%   BMI 27.25 kg/m²   Skin normal, no suspicious skin lesions. ENT normal.  Neck supple. No adenopathy or thyromegaly. NADIR. Lungs are clear, good air entry, no wheezes, rhonchi or rales. S1 and S2 normal, no murmurs, regular rate and rhythm. Abdomen is soft without tenderness, guarding, mass or organomegaly.  exam: Not examined in light of his age and lack of symptoms. .  Extremities show no edema, normal peripheral pulses. Neurological is normal without focal findings. Psychiatric exam, no signs of depression. ASSESSMENT:   1. Well adult exam        PLAN:   Lifestyle advice: discussed diet and exercise  MEDICATIONS:  No orders of the defined types were placed in this encounter.       ORDERS:  Orders Placed This Encounter   Procedures    Comprehensive Metabolic Panel    Lipid Panel     We will contact him when we get results of his blood work. We did discuss diet and exercise. If he has any problems he is to let me know. Follow-up:  Return in about 1 year (around 11/1/2022) for Physical and fasting blood work. PATIENT INSTRUCTIONS:  Patient Instructions   We are committed to providing you with the best care possible. In order to help us achieve these goals please remember to bring all medications, herbal products, and over the counter supplements with you to each visit. If your provider has ordered testing for you, please be sure to follow up with our office if you have not received results within 7 days after the testing took place. *If you receive a survey after visiting one of our offices, please take time to share your experience concerning your physician office visit. These surveys are confidential and no health information about you is shared. We are eager to improve for you and we are counting on your feedback to help make that happen. EMR Dragon/transcription disclaimer:  Much of this encounter note is electronic transcription/translation of spoken language to printed texts. The electronic translation of spoken language may be erroneous, or at times, nonsensical words or phrases may be inadvertently transcribed.   Although I have reviewed the note for such errors, some may still exist.

## 2021-11-29 ENCOUNTER — OFFICE VISIT (OUTPATIENT)
Dept: PRIMARY CARE CLINIC | Age: 46
End: 2021-11-29
Payer: MEDICAID

## 2021-11-29 VITALS
TEMPERATURE: 97.8 F | WEIGHT: 216 LBS | SYSTOLIC BLOOD PRESSURE: 116 MMHG | HEART RATE: 68 BPM | OXYGEN SATURATION: 99 % | DIASTOLIC BLOOD PRESSURE: 78 MMHG | BODY MASS INDEX: 26.86 KG/M2 | HEIGHT: 75 IN

## 2021-11-29 DIAGNOSIS — R09.81 NASAL CONGESTION: ICD-10-CM

## 2021-11-29 DIAGNOSIS — J34.89 SINUS PRESSURE: ICD-10-CM

## 2021-11-29 DIAGNOSIS — R05.9 COUGH: Primary | ICD-10-CM

## 2021-11-29 PROCEDURE — G8427 DOCREV CUR MEDS BY ELIG CLIN: HCPCS | Performed by: NURSE PRACTITIONER

## 2021-11-29 PROCEDURE — G8484 FLU IMMUNIZE NO ADMIN: HCPCS | Performed by: NURSE PRACTITIONER

## 2021-11-29 PROCEDURE — G8419 CALC BMI OUT NRM PARAM NOF/U: HCPCS | Performed by: NURSE PRACTITIONER

## 2021-11-29 PROCEDURE — 1036F TOBACCO NON-USER: CPT | Performed by: NURSE PRACTITIONER

## 2021-11-29 PROCEDURE — 99213 OFFICE O/P EST LOW 20 MIN: CPT | Performed by: NURSE PRACTITIONER

## 2021-11-29 RX ORDER — AZITHROMYCIN 250 MG/1
TABLET, FILM COATED ORAL
Qty: 1 PACKET | Refills: 0 | Status: SHIPPED | OUTPATIENT
Start: 2021-11-29 | End: 2021-12-09

## 2021-11-29 RX ORDER — PREDNISONE 10 MG/1
TABLET ORAL
Qty: 18 TABLET | Refills: 0 | Status: SHIPPED | OUTPATIENT
Start: 2021-11-29 | End: 2022-11-03

## 2021-11-29 RX ORDER — BENZONATATE 100 MG/1
100 CAPSULE ORAL 3 TIMES DAILY PRN
Qty: 30 CAPSULE | Refills: 0 | Status: SHIPPED | OUTPATIENT
Start: 2021-11-29 | End: 2021-12-06

## 2021-11-29 ASSESSMENT — ENCOUNTER SYMPTOMS
NAUSEA: 0
EYES NEGATIVE: 1
ALLERGIC/IMMUNOLOGIC NEGATIVE: 1
COUGH: 1
SORE THROAT: 1
SINUS PAIN: 1
SHORTNESS OF BREATH: 0
DIARRHEA: 0
GASTROINTESTINAL NEGATIVE: 1
CHEST TIGHTNESS: 0

## 2021-11-29 NOTE — PROGRESS NOTES
[address] [facility] [address]  [address] [address]  Dept: [phone]  Dept Fax: [phone]  Loc: [phone]    Lesley Leslie is a 55 y.o. male who presents today for his medical conditions/complaints as noted below. Lesley Leslie is c/o of Cough (Symptoms started 3 weeks ago.), Congestion, and Pharyngitis        HPI:     HPI     This patient is presenting today with concerns of ongoing cough x3 weeks. patient has had congestion, dry cough, and sinus drainage that has progressively gotten worse. He is coughing more frequently but is not coughing anything up. He reports some clear/yellow nasal drainage that appears thick. patient has taken nyquil, dayquil, mucinex, claritin, and vitamin C over the counter with no improvement. patient denies fever, nausea, or vomiting. Chief Complaint   Patient presents with    Cough     Symptoms started 3 weeks ago.  Congestion    Pharyngitis     Past Medical History:   Diagnosis Date    Herniated disc, cervical       No past surgical history on file.     Vitals 11/29/2021 11/1/2021 10/26/2020 10/22/2019 11/11/2015 5/02/1608   SYSTOLIC 349 [phone]   DIASTOLIC 78 80 86 70 92 78   Site Left Upper Arm - Right Upper Arm Left Upper Arm - -   Position Sitting - Sitting Sitting - -   Cuff Size Medium Adult - Medium Adult Large Adult - -   Pulse 68 61 75 67 59 76   Temp 97.8 97.6 98.3 98 98.8 99   Resp - - 16 18 18 16   SpO2 99 98 99 98 97 -   Weight 216 lb 218 lb 216 lb 9.6 oz 222 lb 3.2 oz 212 lb 219 lb   Height 6' 3\" 6' 3\" 6' 3\" 6' 4\" 6' 3\" -   Body mass index 27 kg/m2 27.25 kg/m2 27.07 kg/m2 27.04 kg/m2 26.5 kg/m2 -   Some recent data might be hidden       Family History   Problem Relation Age of Onset    Heart Disease Father        Social History     Tobacco Use    Smoking status: Never Smoker    Smokeless tobacco: Never Used   Substance Use Topics    Alcohol use: No      No current outpatient medications on file prior to visit. No current facility-administered medications on file prior to visit. No Known Allergies    Health Maintenance   Topic Date Due    Hepatitis C screen  Never done    COVID-19 Vaccine (1) Never done    HIV screen  Never done    DTaP/Tdap/Td vaccine (1 - Tdap) 02/14/2013    Colon Cancer Screen FIT/FOBT  10/24/2020    Flu vaccine (1) 09/01/2021    Lipid screen  11/01/2026    Hepatitis A vaccine  Aged Out    Hepatitis B vaccine  Aged Out    Hib vaccine  Aged Out    Meningococcal (ACWY) vaccine  Aged Out    Pneumococcal 0-64 years Vaccine  Aged Out       Subjective:      Review of Systems   Constitutional: Negative. Negative for fatigue and fever. HENT: Positive for congestion, postnasal drip, sinus pain and sore throat. Negative for ear pain. Eyes: Negative. Respiratory: Positive for cough. Negative for chest tightness and shortness of breath. Cardiovascular: Negative. Gastrointestinal: Negative. Negative for diarrhea and nausea. Endocrine: Negative. Genitourinary: Negative. Musculoskeletal: Negative. Skin: Negative. Allergic/Immunologic: Negative. Neurological: Negative. Negative for dizziness. Psychiatric/Behavioral: Negative. Objective:     Physical Exam  Constitutional:       Appearance: Normal appearance. He is normal weight. HENT:      Head: Normocephalic and atraumatic. Right Ear: Tympanic membrane normal. There is no impacted cerumen. Left Ear: Tympanic membrane normal. There is no impacted cerumen. Nose: Congestion present. Mouth/Throat:      Mouth: Mucous membranes are moist.      Pharynx: Oropharynx is clear. Posterior oropharyngeal erythema present. Eyes:      Pupils: Pupils are equal, round, and reactive to light. Cardiovascular:      Rate and Rhythm: Normal rate and regular rhythm. Pulses: Normal pulses. Heart sounds: Normal heart sounds.    Pulmonary:      Effort: Pulmonary effort is normal. No respiratory distress. Breath sounds: Normal breath sounds. No wheezing or rales. Abdominal:      General: Abdomen is flat. Palpations: Abdomen is soft. Musculoskeletal:         General: Normal range of motion. Cervical back: Normal range of motion and neck supple. Skin:     General: Skin is warm and dry. Capillary Refill: Capillary refill takes less than 2 seconds. Neurological:      General: No focal deficit present. Mental Status: He is alert and oriented to person, place, and time. Mental status is at baseline. Psychiatric:         Mood and Affect: Mood normal.         Behavior: Behavior normal.         Thought Content: Thought content normal.         Judgment: Judgment normal.       /78 (Site: Left Upper Arm, Position: Sitting, Cuff Size: Medium Adult)   Pulse 68   Temp 97.8 °F (36.6 °C)   Ht 6' 3\" (1.905 m)   Wt 216 lb (98 kg)   SpO2 99%   BMI 27.00 kg/m²     Assessment:       Diagnosis Orders   1. Cough  azithromycin (ZITHROMAX Z-ZEKE) 250 MG tablet   2. Sinus pressure  azithromycin (ZITHROMAX Z-ZEKE) 250 MG tablet   3. Nasal congestion  azithromycin (ZITHROMAX Z-ZEKE) 250 MG tablet         Plan:   1. Continue taking claritin or another antihistamine of choice daily. May use claritin D for 10-14 days. Flonase called into pharmacy. Use 2 sprays in each nare once daily. Tessalon perles called in to pharmacy. May use as needed for cough. Viral syndrome   Many illnesses are caused by viruses. These conditions usually run their course in 7-14 days. Antibiotics do not help fight viral infections and are not needed at this time. Viral syndromes are treated with symptomatic support. You may take tylenol or ibuprofen for fever or aches and pains. Stay hydrated by taking sips of water or non caffeinated, noncarbonated, and nonalcoholic beverages throughout the day. For sore throat, you may gargle with warm salt water, use lozenges or sprays.  Using a daily antihistamine such as Claritin, Zyrtec or Allegra can help with upper respiratory symptoms. Benadryl can be sedating but is helpful at drying secretions and may be taken at night. Call if you have a fever greater than 102 F or if symptoms do not improve. Your provider may also send you in prescription medications depending on your symptoms and their severity. Take all medications as directed on package unless specifically told otherwise. Z-Zeke take as directed to complete. Antibiotic Therapy  Take your antibiotic as prescribed. Take all of your antibiotics. You should not have any left over. Many antibiotics may cause diarrhea. . you can start an OTC probiotic to support normal gut bacteria. If you are on birth control, you need to use an alternative method of contraceptive for one month as antibiotics can reduce the effectiveness of oral BC. Always monitor for signs of allergic reaction such as itching, hives or any difficulty swallowing or breathing STOP THE MEDICATION IMMEDIATELY. If difficulty breathing, call 911 and go to the ER. If hives and itching, contact provider through [address] or phone for alternative antibiotics or be seen if necessary. Steroid Dosepak take as directed. Patient given educational materials -see patient instructions. Discussed use, benefit, and side effects of prescribed medications. All patient questions answered. Pt voiced understanding. Reviewed health maintenance. Instructed to continue currentmedications, diet and exercise. Patient agreed with treatment plan. Follow up as directed. MEDICATIONS:  Orders Placed This Encounter   Medications    predniSONE (DELTASONE) 10 MG tablet     Sig: Days 1-3 take 1 PO TID, days 4-6 take 1 PO BID, days 7-9 take 1 PO daily.      Dispense:  18 tablet     Refill:  0    azithromycin (ZITHROMAX Z-ZEKE) 250 MG tablet     Sig: Take as directed     Dispense:  1 packet     Refill:  0    benzonatate (TESSALON) 100 MG capsule Sig: Take 1 capsule by mouth 3 times daily as needed for Cough     Dispense:  30 capsule     Refill:  0         ORDERS:  No orders of the defined types were placed in this encounter. Follow-up:  Return if symptoms worsen or fail to improve. PATIENT INSTRUCTIONS:  Patient Instructions   Viral syndrome   Many illnesses are caused by viruses. These conditions usually run their course in 7-14 days. Antibiotics do not help fight viral infections and are not needed at this time. Viral syndromes are treated with symptomatic support. You may take tylenol or ibuprofen for fever or aches and pains. Stay hydrated by taking sips of water or non caffeinated, noncarbonated, and nonalcoholic beverages throughout the day. For sore throat, you may gargle with warm salt water, use lozenges or sprays. Using a daily antihistamine such as Claritin, Zyrtec or Allegra can help with upper respiratory symptoms. Benadryl can be sedating but is helpful at drying secretions and may be taken at night. Call if you have a fever greater than 102 F or if symptoms do not improve. Your provider may also send you in prescription medications depending on your symptoms and their severity. Take all medications as directed on package unless specifically told otherwise. Electronically signed by ELY Moreno NP on 11/29/2021 at 4:42 PM    EMR Dragon/transcription disclaimer:  Much of thisencounter note is electronic transcription/translation of spoken language to printed texts. The electronic translation of spoken language may be erroneous, or at times, nonsensical words or phrases may be inadvertentlytranscribed.   Although I have reviewed the note for such errors, some may still exist.

## 2022-10-20 NOTE — PROGRESS NOTES
Mr. Navarro is 42 y.o. male    Chief Complaint   Patient presents with   • Sterilization       History of Present Illness  The patient has been pondering the option of a vasectomy for Several months. With regard to context of the decision, he presently has 3 children. He is . Associated/Relevant symptoms/signs include None. He voices no additional questions about birth control options.     The following portions of the patient's history were reviewed and updated as appropriate: allergies, current medications, past family history, past medical history, past social history, past surgical history and problem list.    Review of Systems   Constitutional: Negative for appetite change, chills, fever and unexpected weight change.   HENT: Negative for congestion, ear pain, facial swelling, hearing loss, nosebleeds, trouble swallowing and voice change.    Eyes: Negative for photophobia, pain, discharge and visual disturbance.   Respiratory: Negative for cough, choking, chest tightness and shortness of breath.    Cardiovascular: Negative for chest pain and palpitations.   Gastrointestinal: Negative for abdominal distention, abdominal pain, blood in stool, constipation, diarrhea, nausea and vomiting.   Endocrine: Negative for cold intolerance, heat intolerance and polydipsia.   Genitourinary: Negative for decreased urine volume, difficulty urinating, discharge, dysuria, enuresis, flank pain, frequency, genital sores, hematuria, penile pain, penile swelling, scrotal swelling, testicular pain and urgency.   Musculoskeletal: Negative for arthralgias, joint swelling, neck pain and neck stiffness.   Skin: Negative for pallor and rash.   Allergic/Immunologic: Negative for immunocompromised state.   Neurological: Negative for dizziness, tremors, seizures, syncope, light-headedness and headaches.   Hematological: Negative for adenopathy. Does not bruise/bleed easily.   Psychiatric/Behavioral: Negative for agitation, confusion,  "dysphoric mood, hallucinations, self-injury and suicidal ideas.         Current Outpatient Prescriptions:   •  diazePAM (VALIUM) 10 MG tablet, Take 1 tablet by mouth 1 (One) Time for 1 dose. Prior to procedure, Disp: 1 tablet, Rfl: 0  •  oxyCODONE-acetaminophen (PERCOCET) 5-325 MG per tablet, Take 1 tablet by mouth Every 4 (Four) Hours As Needed for Moderate Pain ., Disp: 18 tablet, Rfl: 0    History reviewed. No pertinent past medical history.    No past surgical history on file.    Social History     Social History   • Marital status:      Social History Main Topics   • Smoking status: Never Smoker   • Smokeless tobacco: Never Used   • Alcohol use No   • Drug use: No   • Sexual activity: Defer     Other Topics Concern   • Not on file       Family History   Problem Relation Age of Onset   • No Known Problems Father    • No Known Problems Mother        Objective    Temp 97.3 °F (36.3 °C)   Ht 190.5 cm (75\")   Wt 95.3 kg (210 lb)   BMI 26.25 kg/m²     Physical Exam   Constitutional: He is oriented to person, place, and time. He appears well-developed and well-nourished.   HENT:   Head: Normocephalic and atraumatic.   Eyes: Conjunctivae are normal.   Neck: Normal range of motion. Neck supple.   Cardiovascular: Normal rate and regular rhythm.    Pulmonary/Chest: Effort normal. No respiratory distress. He exhibits no tenderness.   Abdominal: Soft. He exhibits no distension. There is no tenderness. Hernia confirmed negative in the right inguinal area and confirmed negative in the left inguinal area.   Genitourinary: Testes normal and penis normal. Right testis shows no mass, no swelling and no tenderness. Left testis shows no mass, no swelling and no tenderness. No phimosis, paraphimosis or penile tenderness.   Genitourinary Comments: The epididymis is palpably normal bilaterally without mass.  The vas deferens is also palpable bilaterally in the usual location and appears accessible for vasectomy. "   Musculoskeletal: Normal range of motion. He exhibits no edema or deformity.   Neurological: He is alert and oriented to person, place, and time.   Skin: Skin is warm and dry.   Psychiatric: He has a normal mood and affect. His behavior is normal.   Vitals reviewed.      No results found for any previous visit.       No results found for this or any previous visit.  Assessment and Plan    Vale was seen today for sterilization.    Diagnoses and all orders for this visit:    Consultation for sterilization  -     oxyCODONE-acetaminophen (PERCOCET) 5-325 MG per tablet; Take 1 tablet by mouth Every 4 (Four) Hours As Needed for Moderate Pain .  -     diazePAM (VALIUM) 10 MG tablet; Take 1 tablet by mouth 1 (One) Time for 1 dose. Prior to procedure  -     Vasectomy      Vasectomy Consult  Patient here for pre-vasectomy consultation. He denies previous genitourinary surgery. He was given the consent form, pre-vasectomy instruction sheet, and vasectomy booklet. I extensively reviewed with him the likely postoperative recuperative period as well as the need to continue to use contraception until he is notified by us of his sterility as confirmed with two negative post procedure semen analyses.  He understands the potential side effects of anesthesia, bleeding, scrotal hematoma, wound infection, epididymal orchitis, epididymal congestion, chronic testicular pain requiring further surgery, sperm granuloma, antisperm antibodies, early recanalization, spontaneous recanalization with pregnancy after demonstration of azoospermia. He is aware of alternatives to vasectomy. He has given this careful consideration and wishes to proceed with a vasectomy.     Poli reviewed with no suspicious activity     [Flexion] : flexion [Extension] : extension [Bending to left] : bending to left [Bending to right] : bending to right [] : anterior tenderness [FreeTextEntry3] : wearing knee brace

## 2022-11-03 ENCOUNTER — OFFICE VISIT (OUTPATIENT)
Dept: PRIMARY CARE CLINIC | Age: 47
End: 2022-11-03
Payer: MEDICAID

## 2022-11-03 VITALS
DIASTOLIC BLOOD PRESSURE: 72 MMHG | OXYGEN SATURATION: 98 % | BODY MASS INDEX: 27.78 KG/M2 | WEIGHT: 223.4 LBS | SYSTOLIC BLOOD PRESSURE: 122 MMHG | HEIGHT: 75 IN | RESPIRATION RATE: 18 BRPM | TEMPERATURE: 97 F

## 2022-11-03 DIAGNOSIS — Z23 NEED FOR INFLUENZA VACCINATION: ICD-10-CM

## 2022-11-03 DIAGNOSIS — Z00.00 WELL ADULT EXAM: Primary | ICD-10-CM

## 2022-11-03 DIAGNOSIS — R07.9 CHEST PAIN, UNSPECIFIED TYPE: ICD-10-CM

## 2022-11-03 PROBLEM — M54.16 RADICULOPATHY, LUMBAR REGION: Status: ACTIVE | Noted: 2019-09-23

## 2022-11-03 PROBLEM — M54.50 LOW BACK PAIN: Status: ACTIVE | Noted: 2019-09-23

## 2022-11-03 LAB
ALBUMIN SERPL-MCNC: 4.6 G/DL (ref 3.5–5.2)
ALP BLD-CCNC: 96 U/L (ref 40–130)
ALT SERPL-CCNC: 27 U/L (ref 5–41)
ANION GAP SERPL CALCULATED.3IONS-SCNC: 10 MMOL/L (ref 7–19)
AST SERPL-CCNC: 26 U/L (ref 5–40)
BILIRUB SERPL-MCNC: 0.5 MG/DL (ref 0.2–1.2)
BUN BLDV-MCNC: 12 MG/DL (ref 6–20)
CALCIUM SERPL-MCNC: 9.9 MG/DL (ref 8.6–10)
CHLORIDE BLD-SCNC: 101 MMOL/L (ref 98–111)
CHOLESTEROL, TOTAL: 220 MG/DL (ref 160–199)
CO2: 29 MMOL/L (ref 22–29)
CREAT SERPL-MCNC: 1 MG/DL (ref 0.5–1.2)
GFR SERPL CREATININE-BSD FRML MDRD: >60 ML/MIN/{1.73_M2}
GLUCOSE BLD-MCNC: 92 MG/DL (ref 74–109)
HDLC SERPL-MCNC: 54 MG/DL (ref 55–121)
LDL CHOLESTEROL CALCULATED: 142 MG/DL
POTASSIUM SERPL-SCNC: 4.5 MMOL/L (ref 3.5–5)
SODIUM BLD-SCNC: 140 MMOL/L (ref 136–145)
TOTAL PROTEIN: 7.5 G/DL (ref 6.6–8.7)
TRIGL SERPL-MCNC: 122 MG/DL (ref 0–149)

## 2022-11-03 PROCEDURE — 93000 ELECTROCARDIOGRAM COMPLETE: CPT | Performed by: FAMILY MEDICINE

## 2022-11-03 PROCEDURE — G8484 FLU IMMUNIZE NO ADMIN: HCPCS | Performed by: FAMILY MEDICINE

## 2022-11-03 PROCEDURE — 99396 PREV VISIT EST AGE 40-64: CPT | Performed by: FAMILY MEDICINE

## 2022-11-03 PROCEDURE — 90471 IMMUNIZATION ADMIN: CPT | Performed by: FAMILY MEDICINE

## 2022-11-03 PROCEDURE — 90674 CCIIV4 VAC NO PRSV 0.5 ML IM: CPT | Performed by: FAMILY MEDICINE

## 2022-11-03 ASSESSMENT — PATIENT HEALTH QUESTIONNAIRE - PHQ9
1. LITTLE INTEREST OR PLEASURE IN DOING THINGS: 0
SUM OF ALL RESPONSES TO PHQ QUESTIONS 1-9: 0
SUM OF ALL RESPONSES TO PHQ9 QUESTIONS 1 & 2: 0
2. FEELING DOWN, DEPRESSED OR HOPELESS: 0
SUM OF ALL RESPONSES TO PHQ QUESTIONS 1-9: 0

## 2022-11-03 NOTE — PROGRESS NOTES
SUBJECTIVE:   Steve Raymond is a 52 y.o. male presenting for his annual checkup. States he is doing very well. He did have a little bit of left anterior chest pain a week or so ago. However his job is very physical.  There was no shortness of breath and it did not radiate down his arm. Family history is positive for heart disease in his father. He does not smoke. Patient Active Problem List    Diagnosis Date Noted    Low back pain 09/23/2019    Radiculopathy, lumbar region 09/23/2019    Plantar fasciitis 05/27/2016     No current outpatient medications on file. No current facility-administered medications for this visit. Past Medical History:   Diagnosis Date    Herniated disc, cervical      History reviewed. No pertinent surgical history. History reviewed. No pertinent family history. Social History     Tobacco Use    Smoking status: Never    Smokeless tobacco: Never   Substance Use Topics    Alcohol use: No       Allergies: Patient has no known allergies. ROS:  Feeling well. No dyspnea or chest pain on exertion. No abdominal pain, change in bowel habits, black or bloody stools. No urinary tract or prostatic symptoms. No neurological complaints. All other systems negative. OBJECTIVE:   The patient appears well, alert, oriented x 3, in no distress. /72   Temp 97 °F (36.1 °C)   Resp 18   Ht 6' 3\" (1.905 m)   Wt 223 lb 6.4 oz (101.3 kg)   SpO2 98%   BMI 27.92 kg/m²   Skin normal, no suspicious skin lesions. ENT normal.  Neck supple. No adenopathy or thyromegaly. NADIR. Lungs are clear, good air entry, no wheezes, rhonchi or rales. S1 and S2 normal, no murmurs, regular rate and rhythm. Abdomen is soft without tenderness, guarding, mass or organomegaly.  exam: Not examined in light of his age and lack of symptoms. .  Extremities show no edema, normal peripheral pulses. Neurological is normal without focal findings. Psychiatric exam, no signs of depression.        ASSESSMENT: 1. Well adult exam    2. Chest pain, unspecified type        PLAN:   Lifestyle advice: discussed diet and exercise  1. Well adult exam  -     Comprehensive Metabolic Panel  -     Lipid Panel  2. Chest pain, unspecified type  -     EKG 12 Lead   EKG was done and showed mild sinus bradycardia. He was an athlete in school. Axis was normal.  It was otherwise within normal limits. This was reviewed by me. It was compared to one done on November 11, 2015 and was basically normal.    If he develops chest pain or shortness of breath he is to let us know. Overall he is doing very well. He says his father did not have heart disease so I took that off his family history. He is having no prostate problems and there is no family history of early cancer so we will just do a CMP and lipid. Return in about 1 year (around 11/3/2023) for Physical and labs. PATIENT INSTRUCTIONS:  Patient Instructions     Wt Readings from Last 3 Encounters:   11/03/22 223 lb 6.4 oz (101.3 kg)   11/29/21 216 lb (98 kg)   11/01/21 218 lb (98.9 kg)        We are committed to providing you with the best care possible. In order to help us achieve these goals please remember to bring all medications, herbal products, and over the counter supplements with you to each visit. If your provider has ordered testing for you, please be sure to follow up with our office if you have not received results within 7 days after the testing took place. *If you receive a survey after visiting one of our offices, please take time to share your experience concerning your physician office visit. These surveys are confidential and no health information about you is shared. We are eager to improve for you and we are counting on your feedback to help make that happen. EMR Dragon/transcription disclaimer:  Much of this encounter note is electronic transcription/translation of spoken language to printed texts.   The electronic translation of spoken language may be erroneous, or at times, nonsensical words or phrases may be inadvertently transcribed.   Although I have reviewed the note for such errors, some may still exist.

## 2022-11-03 NOTE — PATIENT INSTRUCTIONS
Wt Readings from Last 3 Encounters:   11/03/22 223 lb 6.4 oz (101.3 kg)   11/29/21 216 lb (98 kg)   11/01/21 218 lb (98.9 kg)        We are committed to providing you with the best care possible. In order to help us achieve these goals please remember to bring all medications, herbal products, and over the counter supplements with you to each visit. If your provider has ordered testing for you, please be sure to follow up with our office if you have not received results within 7 days after the testing took place. *If you receive a survey after visiting one of our offices, please take time to share your experience concerning your physician office visit. These surveys are confidential and no health information about you is shared. We are eager to improve for you and we are counting on your feedback to help make that happen.

## 2023-05-30 ENCOUNTER — OFFICE VISIT (OUTPATIENT)
Dept: PRIMARY CARE CLINIC | Age: 48
End: 2023-05-30
Payer: MEDICAID

## 2023-05-30 VITALS
HEART RATE: 60 BPM | SYSTOLIC BLOOD PRESSURE: 110 MMHG | DIASTOLIC BLOOD PRESSURE: 82 MMHG | BODY MASS INDEX: 27.1 KG/M2 | WEIGHT: 218 LBS | HEIGHT: 75 IN | OXYGEN SATURATION: 98 % | TEMPERATURE: 97.4 F | RESPIRATION RATE: 16 BRPM

## 2023-05-30 DIAGNOSIS — J34.89 SINUS PRESSURE: Primary | ICD-10-CM

## 2023-05-30 DIAGNOSIS — R09.81 NASAL CONGESTION: ICD-10-CM

## 2023-05-30 PROCEDURE — G8427 DOCREV CUR MEDS BY ELIG CLIN: HCPCS | Performed by: FAMILY MEDICINE

## 2023-05-30 PROCEDURE — 99213 OFFICE O/P EST LOW 20 MIN: CPT | Performed by: FAMILY MEDICINE

## 2023-05-30 PROCEDURE — G8419 CALC BMI OUT NRM PARAM NOF/U: HCPCS | Performed by: FAMILY MEDICINE

## 2023-05-30 PROCEDURE — 1036F TOBACCO NON-USER: CPT | Performed by: FAMILY MEDICINE

## 2023-05-30 SDOH — ECONOMIC STABILITY: FOOD INSECURITY: WITHIN THE PAST 12 MONTHS, THE FOOD YOU BOUGHT JUST DIDN'T LAST AND YOU DIDN'T HAVE MONEY TO GET MORE.: NEVER TRUE

## 2023-05-30 SDOH — ECONOMIC STABILITY: HOUSING INSECURITY
IN THE LAST 12 MONTHS, WAS THERE A TIME WHEN YOU DID NOT HAVE A STEADY PLACE TO SLEEP OR SLEPT IN A SHELTER (INCLUDING NOW)?: NO

## 2023-05-30 SDOH — ECONOMIC STABILITY: FOOD INSECURITY: WITHIN THE PAST 12 MONTHS, YOU WORRIED THAT YOUR FOOD WOULD RUN OUT BEFORE YOU GOT MONEY TO BUY MORE.: NEVER TRUE

## 2023-05-30 SDOH — ECONOMIC STABILITY: INCOME INSECURITY: HOW HARD IS IT FOR YOU TO PAY FOR THE VERY BASICS LIKE FOOD, HOUSING, MEDICAL CARE, AND HEATING?: NOT HARD AT ALL

## 2023-05-30 ASSESSMENT — ENCOUNTER SYMPTOMS
RHINORRHEA: 1
ABDOMINAL PAIN: 0
CHEST TIGHTNESS: 0
WHEEZING: 0
BLOOD IN STOOL: 0
SHORTNESS OF BREATH: 0
SORE THROAT: 1

## 2023-05-30 ASSESSMENT — PATIENT HEALTH QUESTIONNAIRE - PHQ9
SUM OF ALL RESPONSES TO PHQ QUESTIONS 1-9: 0
2. FEELING DOWN, DEPRESSED OR HOPELESS: 0
1. LITTLE INTEREST OR PLEASURE IN DOING THINGS: 0
SUM OF ALL RESPONSES TO PHQ QUESTIONS 1-9: 0
SUM OF ALL RESPONSES TO PHQ9 QUESTIONS 1 & 2: 0
SUM OF ALL RESPONSES TO PHQ QUESTIONS 1-9: 0
SUM OF ALL RESPONSES TO PHQ QUESTIONS 1-9: 0

## 2023-05-30 NOTE — PROGRESS NOTES
Sherrell Koyanagi (:  1975) is a 52 y.o. male,Established patient, here for evaluation of the following chief complaint(s):  Sinusitis (For the last few days he has been having congestion, itchy eyes, sore throat, cough with yellow phlegm. Has tried OTC medications with no relief.)         ASSESSMENT/PLAN:  1. Sinus pressure  2. Nasal congestion      Patient's symptoms consistent with seasonal rhinitis. Advised patient on OTC medication use and recommended that he be consistent in taking it. Discussed with him the most beneficial combination is typically a second-generation antihistamine such as Claritin, Zyrtec, or Allegra in addition to [place]. Mucinex may or may not be added in to help break up the mucus. Also advised patient on potentially using the D variants which also contain pseudoephedrine but may be somewhat sedating. It may be beneficial to take this in the evenings to help him sleep and breathe overnight. Also discussed [place]idan pot usage with patient and advised him to use warm distilled water. Benefits of humidifier use also discussed. I also discussed with patient option of Singulair however patient is okay holding off on this at this time and considering it if the above is unsuccessful. Return if symptoms worsen or fail to improve. Subjective   SUBJECTIVE/OBJECTIVE:  Sherrell Koyanagi is a 52 y.o. male who presents with severe sinus congestion. Patient says this has been ongoing off and on for a while now though has been worse over the last few days. Patient says he simultaneously has itchy eyes. Patient denies any fevers. Patient says one of his children has had some symptoms of being sick. Patient says he does work outside a lot. Patient has tried taking OTC medications but has not been consistent with this and says that it did help when he took it but it made him feel groggy. Review of Systems   Constitutional:  Negative for activity change and fever.    HENT:

## 2023-08-09 ENCOUNTER — OFFICE VISIT (OUTPATIENT)
Dept: PRIMARY CARE CLINIC | Age: 48
End: 2023-08-09
Payer: MEDICAID

## 2023-08-09 VITALS
HEIGHT: 76 IN | WEIGHT: 218 LBS | TEMPERATURE: 97 F | DIASTOLIC BLOOD PRESSURE: 80 MMHG | SYSTOLIC BLOOD PRESSURE: 110 MMHG | RESPIRATION RATE: 16 BRPM | HEART RATE: 67 BPM | BODY MASS INDEX: 26.55 KG/M2 | OXYGEN SATURATION: 98 %

## 2023-08-09 DIAGNOSIS — S90.851A SPLINTER OF RIGHT FOOT, INITIAL ENCOUNTER: Primary | ICD-10-CM

## 2023-08-09 PROCEDURE — G8427 DOCREV CUR MEDS BY ELIG CLIN: HCPCS | Performed by: FAMILY MEDICINE

## 2023-08-09 PROCEDURE — G8419 CALC BMI OUT NRM PARAM NOF/U: HCPCS | Performed by: FAMILY MEDICINE

## 2023-08-09 PROCEDURE — 99213 OFFICE O/P EST LOW 20 MIN: CPT | Performed by: FAMILY MEDICINE

## 2023-08-09 PROCEDURE — 10120 INC&RMVL FB SUBQ TISS SMPL: CPT | Performed by: FAMILY MEDICINE

## 2023-08-09 PROCEDURE — 1036F TOBACCO NON-USER: CPT | Performed by: FAMILY MEDICINE

## 2023-08-09 RX ORDER — SULFAMETHOXAZOLE AND TRIMETHOPRIM 800; 160 MG/1; MG/1
1 TABLET ORAL 2 TIMES DAILY
Qty: 14 TABLET | Refills: 0 | Status: SHIPPED | OUTPATIENT
Start: 2023-08-09 | End: 2023-08-16

## 2023-08-09 ASSESSMENT — ENCOUNTER SYMPTOMS
COLOR CHANGE: 0
NAUSEA: 0
ABDOMINAL PAIN: 0
VOMITING: 0
COUGH: 0
CONSTIPATION: 0
RHINORRHEA: 0
DIARRHEA: 0

## 2023-08-09 NOTE — PROGRESS NOTES
out the antibiotics and let us know if it is not improving. PDMP Monitoring:    Last PDMP [place] as Reviewed [facility]):  Review User Review Instant Review Result            Urine Drug Screenings (1 yr)    No resulted procedures found. Medication Contract and Consent for Opioid Use Documents Filed        No documents found                     EMR Dragon/transcription disclaimer:  Much of this encounter note is electronic transcription/translation of spoken language toprinted texts. The electronic translation of spoken language may be erroneous, or at times, nonsensical words or phrases may be inadvertently transcribed.   Although I have reviewed the note for such errors, some may stillexist.

## 2023-11-06 ENCOUNTER — OFFICE VISIT (OUTPATIENT)
Dept: PRIMARY CARE CLINIC | Age: 48
End: 2023-11-06
Payer: MEDICAID

## 2023-11-06 VITALS
WEIGHT: 221 LBS | TEMPERATURE: 97.8 F | RESPIRATION RATE: 16 BRPM | HEART RATE: 60 BPM | SYSTOLIC BLOOD PRESSURE: 120 MMHG | DIASTOLIC BLOOD PRESSURE: 74 MMHG | HEIGHT: 76 IN | BODY MASS INDEX: 26.91 KG/M2 | OXYGEN SATURATION: 98 %

## 2023-11-06 DIAGNOSIS — Z00.00 WELL ADULT EXAM: Primary | ICD-10-CM

## 2023-11-06 DIAGNOSIS — Z23 NEED FOR IMMUNIZATION AGAINST INFLUENZA: ICD-10-CM

## 2023-11-06 LAB
ALBUMIN SERPL-MCNC: 4.5 G/DL (ref 3.5–5.2)
ALP SERPL-CCNC: 89 U/L (ref 40–130)
ALT SERPL-CCNC: 33 U/L (ref 5–41)
ANION GAP SERPL CALCULATED.3IONS-SCNC: 6 MMOL/L (ref 7–19)
AST SERPL-CCNC: 37 U/L (ref 5–40)
BILIRUB SERPL-MCNC: 0.4 MG/DL (ref 0.2–1.2)
BUN SERPL-MCNC: 13 MG/DL (ref 6–20)
CALCIUM SERPL-MCNC: 9.5 MG/DL (ref 8.6–10)
CHLORIDE SERPL-SCNC: 104 MMOL/L (ref 98–111)
CHOLEST SERPL-MCNC: 228 MG/DL (ref 160–199)
CO2 SERPL-SCNC: 31 MMOL/L (ref 22–29)
CREAT SERPL-MCNC: 1 MG/DL (ref 0.5–1.2)
ERYTHROCYTE [DISTWIDTH] IN BLOOD BY AUTOMATED COUNT: 12.9 % (ref 11.5–14.5)
GLUCOSE SERPL-MCNC: 89 MG/DL (ref 74–109)
HCT VFR BLD AUTO: 50 % (ref 42–52)
HDLC SERPL-MCNC: 53 MG/DL (ref 55–121)
HGB BLD-MCNC: 15.8 G/DL (ref 14–18)
LDLC SERPL CALC-MCNC: 147 MG/DL
MCH RBC QN AUTO: 28.2 PG (ref 27–31)
MCHC RBC AUTO-ENTMCNC: 31.6 G/DL (ref 33–37)
MCV RBC AUTO: 89.1 FL (ref 80–94)
PLATELET # BLD AUTO: 211 K/UL (ref 130–400)
PMV BLD AUTO: 10.5 FL (ref 9.4–12.4)
POTASSIUM SERPL-SCNC: 4.4 MMOL/L (ref 3.5–5)
PROT SERPL-MCNC: 7.4 G/DL (ref 6.6–8.7)
RBC # BLD AUTO: 5.61 M/UL (ref 4.7–6.1)
SODIUM SERPL-SCNC: 141 MMOL/L (ref 136–145)
TRIGL SERPL-MCNC: 141 MG/DL (ref 0–149)
WBC # BLD AUTO: 5.6 K/UL (ref 4.8–10.8)

## 2023-11-06 PROCEDURE — 90471 IMMUNIZATION ADMIN: CPT | Performed by: FAMILY MEDICINE

## 2023-11-06 PROCEDURE — 99396 PREV VISIT EST AGE 40-64: CPT | Performed by: FAMILY MEDICINE

## 2023-11-06 PROCEDURE — 90674 CCIIV4 VAC NO PRSV 0.5 ML IM: CPT | Performed by: FAMILY MEDICINE

## 2023-11-06 PROCEDURE — G8482 FLU IMMUNIZE ORDER/ADMIN: HCPCS | Performed by: FAMILY MEDICINE

## 2023-11-06 ASSESSMENT — PATIENT HEALTH QUESTIONNAIRE - PHQ9
SUM OF ALL RESPONSES TO PHQ QUESTIONS 1-9: 0
SUM OF ALL RESPONSES TO PHQ9 QUESTIONS 1 & 2: 0
1. LITTLE INTEREST OR PLEASURE IN DOING THINGS: 0
2. FEELING DOWN, DEPRESSED OR HOPELESS: 0

## 2023-11-06 NOTE — PATIENT INSTRUCTIONS
We are committed to providing you with the best care possible. In order to help us achieve these goals please remember to bring all medications, herbal products, and over the counter supplements with you to each visit. If your provider has ordered testing for you, please be sure to follow up with our office if you have not received results within 7 days after the testing took place. *If you receive a survey after visiting one of our offices, please take time to share your experience concerning your physician office visit. These surveys are confidential and no health information about you is shared. We are eager to improve for you and we are counting on your feedback to help make that happen.          Wt Readings from Last 3 Encounters:   11/06/23 100.2 kg (221 lb)   08/09/23 98.9 kg (218 lb)   05/30/23 98.9 kg (218 lb)

## 2023-11-06 NOTE — PROGRESS NOTES
lb)   08/09/23 98.9 kg (218 lb)   05/30/23 98.9 kg (218 lb)              EMR Dragon/transcription disclaimer:  Much of this encounter note is electronic transcription/translation of spoken language to printed texts. The electronic translation of spoken language may be erroneous, or at times, nonsensical words or phrases may be inadvertently transcribed.   Although I have reviewed the note for such errors, some may still exist.

## 2024-11-12 ENCOUNTER — OFFICE VISIT (OUTPATIENT)
Dept: PRIMARY CARE CLINIC | Age: 49
End: 2024-11-12

## 2024-11-12 VITALS
OXYGEN SATURATION: 98 % | SYSTOLIC BLOOD PRESSURE: 119 MMHG | TEMPERATURE: 96.9 F | HEIGHT: 74 IN | RESPIRATION RATE: 18 BRPM | BODY MASS INDEX: 28.88 KG/M2 | HEART RATE: 65 BPM | DIASTOLIC BLOOD PRESSURE: 76 MMHG | WEIGHT: 225 LBS

## 2024-11-12 DIAGNOSIS — Z00.00 WELL ADULT EXAM: Primary | ICD-10-CM

## 2024-11-12 DIAGNOSIS — Z23 NEED FOR INFLUENZA VACCINATION: ICD-10-CM

## 2024-11-12 SDOH — ECONOMIC STABILITY: FOOD INSECURITY: WITHIN THE PAST 12 MONTHS, YOU WORRIED THAT YOUR FOOD WOULD RUN OUT BEFORE YOU GOT MONEY TO BUY MORE.: NEVER TRUE

## 2024-11-12 SDOH — ECONOMIC STABILITY: FOOD INSECURITY: WITHIN THE PAST 12 MONTHS, THE FOOD YOU BOUGHT JUST DIDN'T LAST AND YOU DIDN'T HAVE MONEY TO GET MORE.: NEVER TRUE

## 2024-11-12 SDOH — ECONOMIC STABILITY: INCOME INSECURITY: HOW HARD IS IT FOR YOU TO PAY FOR THE VERY BASICS LIKE FOOD, HOUSING, MEDICAL CARE, AND HEATING?: NOT HARD AT ALL

## 2024-11-12 ASSESSMENT — PATIENT HEALTH QUESTIONNAIRE - PHQ9
SUM OF ALL RESPONSES TO PHQ QUESTIONS 1-9: 0
SUM OF ALL RESPONSES TO PHQ QUESTIONS 1-9: 0
1. LITTLE INTEREST OR PLEASURE IN DOING THINGS: NOT AT ALL
2. FEELING DOWN, DEPRESSED OR HOPELESS: NOT AT ALL
SUM OF ALL RESPONSES TO PHQ9 QUESTIONS 1 & 2: 0
SUM OF ALL RESPONSES TO PHQ QUESTIONS 1-9: 0
SUM OF ALL RESPONSES TO PHQ QUESTIONS 1-9: 0

## 2024-11-12 NOTE — PROGRESS NOTES
SUBJECTIVE:   Fan Parkinson is a 49 y.o. male presenting for his annual checkup.  He is doing very well.  There is no change in his family history.  He would like a flu shot.  He feels good    Patient Active Problem List    Diagnosis Date Noted    Low back pain 09/23/2019    Radiculopathy, lumbar region 09/23/2019    Plantar fasciitis 05/27/2016     No current outpatient medications on file.     No current facility-administered medications for this visit.     Past Medical History:   Diagnosis Date    Herniated disc, cervical      History reviewed. No pertinent surgical history.  History reviewed. No pertinent family history.  Social History     Tobacco Use    Smoking status: Never    Smokeless tobacco: Never   Substance Use Topics    Alcohol use: No       Allergies: Patient has no known allergies.    ROS:  Feeling well. No dyspnea or chest pain on exertion. No abdominal pain, change in bowel habits, black or bloody stools. No urinary tract or prostatic symptoms. No neurological complaints.  All other systems negative.     OBJECTIVE:   The patient appears well, alert, oriented x 3, in no distress.   /76   Pulse 65   Temp 96.9 °F (36.1 °C)   Resp 18   Ht 1.88 m (6' 2\")   Wt 102.1 kg (225 lb)   SpO2 98%   BMI 28.89 kg/m²   Skin normal, no suspicious skin lesions.  ENT normal.  Neck supple. No adenopathy or thyromegaly. NADIR. Lungs are clear, good air entry, no wheezes, rhonchi or rales. S1 and S2 normal, no murmurs, regular rate and rhythm. Abdomen is soft without tenderness, guarding, mass or organomegaly.  exam: Not examined today in light of lack of symptoms.  Extremities show no edema, normal peripheral pulses. Neurological is normal without focal findings.Psychiatric exam, no signs of depression.       ASSESSMENT:   1. Well adult exam    2. Need for influenza vaccination        PLAN:   Lifestyle advice: discussed diet and exercise  1. Well adult exam  -     Comprehensive Metabolic Panel; Future  -

## 2024-12-04 ENCOUNTER — TELEPHONE (OUTPATIENT)
Dept: PRIMARY CARE CLINIC | Age: 49
End: 2024-12-04

## 2024-12-04 DIAGNOSIS — R53.83 OTHER FATIGUE: Primary | ICD-10-CM

## 2024-12-04 NOTE — TELEPHONE ENCOUNTER
Pt is requesting to have his testosterone labs checked, he has started working out and thinks he needs checked, no symptoms

## 2024-12-06 ENCOUNTER — LAB (OUTPATIENT)
Dept: PRIMARY CARE CLINIC | Age: 49
End: 2024-12-06

## 2024-12-06 DIAGNOSIS — R53.83 OTHER FATIGUE: ICD-10-CM

## 2024-12-06 DIAGNOSIS — Z00.00 WELL ADULT EXAM: ICD-10-CM

## 2024-12-06 LAB
ALBUMIN SERPL-MCNC: 4.4 G/DL (ref 3.5–5.2)
ALP SERPL-CCNC: 87 U/L (ref 40–129)
ALT SERPL-CCNC: 28 U/L (ref 5–41)
ANION GAP SERPL CALCULATED.3IONS-SCNC: 16 MMOL/L (ref 7–19)
AST SERPL-CCNC: 31 U/L (ref 5–40)
BILIRUB SERPL-MCNC: 0.5 MG/DL (ref 0.2–1.2)
BUN SERPL-MCNC: 14 MG/DL (ref 6–20)
CALCIUM SERPL-MCNC: 9.5 MG/DL (ref 8.6–10)
CHLORIDE SERPL-SCNC: 99 MMOL/L (ref 98–111)
CHOLEST SERPL-MCNC: 202 MG/DL (ref 0–199)
CO2 SERPL-SCNC: 25 MMOL/L (ref 22–29)
CREAT SERPL-MCNC: 1 MG/DL (ref 0.7–1.2)
GLUCOSE SERPL-MCNC: 63 MG/DL (ref 70–99)
HDLC SERPL-MCNC: 55 MG/DL (ref 40–60)
LDLC SERPL CALC-MCNC: 129 MG/DL
POTASSIUM SERPL-SCNC: 6.4 MMOL/L (ref 3.5–5)
PROT SERPL-MCNC: 6.9 G/DL (ref 6.4–8.3)
SODIUM SERPL-SCNC: 140 MMOL/L (ref 136–145)
TRIGL SERPL-MCNC: 92 MG/DL (ref 0–149)

## 2024-12-07 DIAGNOSIS — E87.5 SERUM POTASSIUM ELEVATED: Primary | ICD-10-CM

## 2024-12-07 NOTE — PROGRESS NOTES
He says he is on creatine supplement and he thinks this is cause. He feels fine no palpiations,chest pain or cramping. He doesn't want to have it checked this weekend. I told him he can go thru ER as out patient and he doesn't want to do this. He wants to wait till Monday. He will not take any more supplements. He says he feels fine and no need to check it . I told him order is in chart if he changes his mind. He said if he goes he will let me know . I did give him my phone number if he had any questions. I told him how high potassium can effect the heart.

## 2024-12-09 DIAGNOSIS — E87.5 SERUM POTASSIUM ELEVATED: ICD-10-CM

## 2024-12-09 LAB — POTASSIUM SERPL-SCNC: 4.3 MMOL/L (ref 3.5–5)

## 2024-12-10 LAB
SHBG SERPL-SCNC: 35 NMOL/L (ref 17–56)
SHBG SERPL-SCNC: 97.2 PG/ML (ref 47–244)
TESTOST SERPL-MCNC: 482 NG/DL (ref 249–836)